# Patient Record
Sex: FEMALE | URBAN - METROPOLITAN AREA
[De-identification: names, ages, dates, MRNs, and addresses within clinical notes are randomized per-mention and may not be internally consistent; named-entity substitution may affect disease eponyms.]

---

## 2023-02-15 ENCOUNTER — TELEPHONE ENCOUNTER (OUTPATIENT)
Dept: URBAN - METROPOLITAN AREA CLINIC 46 | Facility: CLINIC | Age: 23
End: 2023-02-15

## 2023-02-15 ENCOUNTER — OFFICE VISIT (OUTPATIENT)
Dept: URBAN - METROPOLITAN AREA TELEHEALTH 2 | Facility: TELEHEALTH | Age: 23
End: 2023-02-15
Payer: COMMERCIAL

## 2023-02-15 VITALS — HEIGHT: 63 IN | BODY MASS INDEX: 23.92 KG/M2 | WEIGHT: 135 LBS

## 2023-02-15 DIAGNOSIS — R10.13 DYSPEPSIA: ICD-10-CM

## 2023-02-15 PROCEDURE — 99203 OFFICE O/P NEW LOW 30 MIN: CPT | Performed by: INTERNAL MEDICINE

## 2023-02-15 PROCEDURE — 99442 PHONE E/M BY PHYS 11-20 MIN: CPT | Performed by: INTERNAL MEDICINE

## 2023-02-15 PROCEDURE — 99204 OFFICE O/P NEW MOD 45 MIN: CPT | Performed by: INTERNAL MEDICINE

## 2023-02-15 RX ORDER — NORETHINDRONE ACETATE AND ETHINYL ESTRADIOL 1MG-20(21)
TAKE 1 TABLET BY MOUTH IN THE MORNING KIT ORAL
Qty: 84 EACH | Refills: 1 | Status: ACTIVE | COMMUNITY

## 2023-02-15 RX ORDER — OMEPRAZOLE 40 MG/1
1 CAPSULE 30 MINUTES BEFORE MORNING MEAL CAPSULE, DELAYED RELEASE ORAL ONCE A DAY
Qty: 30 | Refills: 3 | OUTPATIENT
Start: 2023-02-15

## 2023-02-15 NOTE — HPI-TODAY'S VISIT:
Pt with 18 months of post prandial dyspepsia: early satiety and fullness in upper abdomen; mild nausea; anoreixia. No change in bowels. + 15 lb weight loss. Trial of TUMS with incomplete relief. No PPI trial. Labs 3/2021 with normal CMP and CBC; RUQ sonogram 2/2023 with contracted GB and no stones or ductal dilation.

## 2023-02-27 ENCOUNTER — OFFICE VISIT (OUTPATIENT)
Dept: URBAN - METROPOLITAN AREA SURGERY CENTER 28 | Facility: SURGERY CENTER | Age: 23
End: 2023-02-27

## 2023-09-29 ENCOUNTER — CLAIMS CREATED FROM THE CLAIM WINDOW (OUTPATIENT)
Dept: URBAN - METROPOLITAN AREA CLINIC 46 | Facility: CLINIC | Age: 23
End: 2023-09-29
Payer: COMMERCIAL

## 2023-09-29 ENCOUNTER — TELEPHONE ENCOUNTER (OUTPATIENT)
Dept: URBAN - METROPOLITAN AREA CLINIC 44 | Facility: CLINIC | Age: 23
End: 2023-09-29

## 2023-09-29 VITALS
TEMPERATURE: 98.1 F | DIASTOLIC BLOOD PRESSURE: 82 MMHG | HEIGHT: 63 IN | SYSTOLIC BLOOD PRESSURE: 139 MMHG | WEIGHT: 151 LBS | BODY MASS INDEX: 26.75 KG/M2 | HEART RATE: 106 BPM

## 2023-09-29 DIAGNOSIS — R10.13 EPIGASTRIC PAIN: ICD-10-CM

## 2023-09-29 DIAGNOSIS — R10.13 DYSPEPSIA: ICD-10-CM

## 2023-09-29 PROCEDURE — 99214 OFFICE O/P EST MOD 30 MIN: CPT | Performed by: INTERNAL MEDICINE

## 2023-09-29 RX ORDER — OCTISALATE, AVOBENZONE, HOMOSALATE, AND OCTOCRYLENE 29.4; 29.4; 49; 25.48 MG/ML; MG/ML; MG/ML; MG/ML
AS DIRECTED LOTION TOPICAL
Qty: 60 | Refills: 0 | OUTPATIENT
Start: 2023-09-29 | End: 2023-11-27

## 2023-09-29 RX ORDER — PANTOPRAZOLE SODIUM 40 MG/1
1 TABLET TABLET, DELAYED RELEASE ORAL ONCE A DAY
Qty: 30 | Refills: 3 | OUTPATIENT
Start: 2023-09-29

## 2023-09-29 RX ORDER — CIPROFLOXACIN HYDROCHLORIDE 500 MG/1
1 TABLET TABLET, FILM COATED ORAL
Qty: 20 TABLET | Refills: 0 | OUTPATIENT
Start: 2023-09-29 | End: 2023-10-09

## 2023-09-29 NOTE — HPI-TODAY'S VISIT:
2/2023: Pt with 18 months of post prandial dyspepsia: early satiety and fullness in upper abdomen; mild nausea; anoreixia. No change in bowels. + 15 lb weight loss. Trial of TUMS with incomplete relief. No PPI trial. Labs 3/2021 with normal CMP and CBC; RUQ sonogram 2/2023 with contracted GB and no stones or ductal dilation.....labs ordered and EGD but not completed. Omeprazole 40mg prescribed  9/29/2023: For a follow up. Symptoms intially mildly better on Omeprazole but then stopped working and off for several months. Still with some early satiety and 2 days a week with severe cramping in mid abdomen. GERD also flared for the last month. No diarrhea or change in bowels. Weight stable. No systemic complaints.

## 2023-10-17 LAB
A/G RATIO: 1.6
ABSOLUTE BASOPHILS: 31
ABSOLUTE EOSINOPHILS: 140
ABSOLUTE LYMPHOCYTES: 2050
ABSOLUTE MONOCYTES: 439
ABSOLUTE NEUTROPHILS: 3440
ALBUMIN: 4.6
ALKALINE PHOSPHATASE: 50
ALT (SGPT): 7
AST (SGOT): 11
BASOPHILS: 0.5
BILIRUBIN, TOTAL: 0.4
BUN/CREATININE RATIO: (no result)
BUN: 11
C-REACTIVE PROTEIN, QUANT: 0.5
CALCIUM: 9.7
CARBON DIOXIDE, TOTAL: 24
CHLORIDE: 106
CREATININE: 0.7
EGFR: 126
EOSINOPHILS: 2.3
GLOBULIN, TOTAL: 2.9
GLUCOSE: 95
HEMATOCRIT: 38.4
HEMOGLOBIN: 12.9
LYMPHOCYTES: 33.6
MCH: 29
MCHC: 33.6
MCV: 86.3
MONOCYTES: 7.2
MPV: 10.6
NEUTROPHILS: 56.4
PLATELET COUNT: 260
POTASSIUM: 4.4
PROTEIN, TOTAL: 7.5
RDW: 12.3
RED BLOOD CELL COUNT: 4.45
SODIUM: 140
WHITE BLOOD CELL COUNT: 6.1

## 2023-10-19 ENCOUNTER — TELEPHONE ENCOUNTER (OUTPATIENT)
Dept: URBAN - METROPOLITAN AREA CLINIC 44 | Facility: CLINIC | Age: 23
End: 2023-10-19

## 2023-12-01 ENCOUNTER — DASHBOARD ENCOUNTERS (OUTPATIENT)
Age: 23
End: 2023-12-01

## 2023-12-04 ENCOUNTER — OFFICE VISIT (OUTPATIENT)
Dept: URBAN - METROPOLITAN AREA CLINIC 48 | Facility: CLINIC | Age: 23
End: 2023-12-04

## 2023-12-04 RX ORDER — PANTOPRAZOLE SODIUM 40 MG/1
1 TABLET TABLET, DELAYED RELEASE ORAL ONCE A DAY
Qty: 30 | Refills: 3 | Status: ACTIVE | COMMUNITY
Start: 2023-09-29